# Patient Record
Sex: OTHER/UNKNOWN | URBAN - METROPOLITAN AREA
[De-identification: names, ages, dates, MRNs, and addresses within clinical notes are randomized per-mention and may not be internally consistent; named-entity substitution may affect disease eponyms.]

---

## 2021-03-23 ENCOUNTER — TELEPHONE ENCOUNTER (OUTPATIENT)
Dept: URBAN - METROPOLITAN AREA CLINIC 35 | Facility: CLINIC | Age: 56
End: 2021-03-23

## 2021-03-23 ENCOUNTER — OFFICE VISIT (OUTPATIENT)
Dept: URBAN - METROPOLITAN AREA CLINIC 35 | Facility: CLINIC | Age: 56
End: 2021-03-23

## 2021-03-23 VITALS
DIASTOLIC BLOOD PRESSURE: 70 MMHG | BODY MASS INDEX: 26.16 KG/M2 | WEIGHT: 157 LBS | SYSTOLIC BLOOD PRESSURE: 118 MMHG | TEMPERATURE: 97.4 F | HEIGHT: 65 IN

## 2021-03-23 RX ORDER — SODIUM SULFATE, MAGNESIUM SULFATE, AND POTASSIUM CHLORIDE 17.75; 2.7; 2.25 G/1; G/1; G/1
12 TABLETS AS DIRECTED TABLET ORAL
Qty: 1 KIT | Refills: 0 | OUTPATIENT
Start: 2021-03-23

## 2021-03-23 RX ORDER — ASPIRIN 325 MG/1
1 TABLET TABLET, FILM COATED ORAL ONCE A DAY
Qty: 30 | Status: ACTIVE | COMMUNITY

## 2021-03-23 RX ORDER — MULTIVIT WITH CALCIUM,IRON,MIN 27MG-0.4MG
AS DIRECTED TABLET ORAL
Status: ACTIVE | COMMUNITY

## 2021-03-23 RX ORDER — LISINOPRIL 5 MG/1
TABLET ORAL
Qty: 90 DELAYED RELEASE TABLET | Status: ACTIVE | COMMUNITY

## 2021-03-23 RX ORDER — FLUTICASONE PROPIONATE 50 UG/1
SPRAY, METERED NASAL
Qty: 16 G | Status: ACTIVE | COMMUNITY

## 2021-03-23 RX ORDER — ATORVASTATIN CALCIUM 20 MG/1
TABLET, FILM COATED ORAL
Qty: 90 DELAYED RELEASE TABLET | Status: ACTIVE | COMMUNITY

## 2021-03-23 NOTE — EXAM-MIGRATED EXAMINATIONS
GENERAL APPEARANCE: - alert, in no acute distress, well developed, well nourished;   HEAD: - normocephalic, atraumatic;   EYES: - sclera anicteric bilaterally;   ORAL CAVITY: - mucosa moist, MP 1;   THROAT: - clear;   NECK/THYROID: - neck supple, full range of motion, no cervical lymphadenopathy, no thyroid nodules, no thyromegaly, trachea midline;   SKIN: - warm and dry, no icterus;   HEART: - no murmurs, regular rate and rhythm, S1, S2 normal;   LUNGS: - clear to auscultation bilaterally, good air movement, no wheezes, rales, rhonchi;   ABDOMEN: - bowel sounds present, no masses palpable, no organomegaly , no rebound tenderness, soft, nontender, nondistended;   MUSCULOSKELETAL: - normal posture, normal gait and station, no decreased range of motion;   EXTREMITIES: - no clubbing, cyanosis, or edema;   PSYCH: - cooperative with exam, mood/affect full range;

## 2021-03-23 NOTE — HPI-MIGRATED HPI
;     Colorectal Cancer Screening : Patient is a 55 year old female whom presents today for consultation for a colonoscopy.  Patient admits her mother had history of benign colon polyps.   Patient currently admits 1-2 BMs per day. Stools are normal without melena, blood, or mucus.   Patient is taking Aspirin 325 daily due to a stroke in 2012.  No abdominal pain. No weight loss No CP, SOB or fever. No sleep apnea No cardiac or pulmonary issues ;

## 2021-03-29 ENCOUNTER — OFFICE VISIT (OUTPATIENT)
Dept: URBAN - METROPOLITAN AREA SURGERY CENTER 8 | Facility: SURGERY CENTER | Age: 56
End: 2021-03-29

## 2021-04-05 ENCOUNTER — TELEPHONE ENCOUNTER (OUTPATIENT)
Dept: URBAN - METROPOLITAN AREA CLINIC 35 | Facility: CLINIC | Age: 56
End: 2021-04-05

## 2021-04-20 ENCOUNTER — OFFICE VISIT (OUTPATIENT)
Dept: URBAN - METROPOLITAN AREA CLINIC 35 | Facility: CLINIC | Age: 56
End: 2021-04-20

## 2024-10-15 ENCOUNTER — TELEPHONE ENCOUNTER (OUTPATIENT)
Dept: URBAN - METROPOLITAN AREA CLINIC 35 | Facility: CLINIC | Age: 59
End: 2024-10-15

## 2024-10-15 ENCOUNTER — DASHBOARD ENCOUNTERS (OUTPATIENT)
Age: 59
End: 2024-10-15

## 2024-10-15 ENCOUNTER — OFFICE VISIT (OUTPATIENT)
Dept: URBAN - METROPOLITAN AREA CLINIC 35 | Facility: CLINIC | Age: 59
End: 2024-10-15
Payer: COMMERCIAL

## 2024-10-15 VITALS
BODY MASS INDEX: 27.32 KG/M2 | DIASTOLIC BLOOD PRESSURE: 72 MMHG | HEIGHT: 65 IN | WEIGHT: 164 LBS | SYSTOLIC BLOOD PRESSURE: 120 MMHG

## 2024-10-15 DIAGNOSIS — Z12.11 COLON CANCER SCREENING: ICD-10-CM

## 2024-10-15 DIAGNOSIS — Z83.719 FAMILY HISTORY OF COLONIC POLYPS: ICD-10-CM

## 2024-10-15 PROCEDURE — 99202 OFFICE O/P NEW SF 15 MIN: CPT | Performed by: PHYSICIAN ASSISTANT

## 2024-10-15 RX ORDER — MULTIVIT WITH CALCIUM,IRON,MIN 27MG-0.4MG
AS DIRECTED TABLET ORAL
Status: ACTIVE | COMMUNITY

## 2024-10-15 RX ORDER — LORATADINE 10 MG/1
1 TABLET TABLET ORAL ONCE A DAY
Status: ACTIVE | COMMUNITY

## 2024-10-15 RX ORDER — ALUMINUM ZIRCONIUM TRICHLOROHYDREX GLY 0.19 G/G
1 TABLET 30 MINUTES BEFORE MORNING MEAL STICK TOPICAL ONCE A DAY
Status: ACTIVE | COMMUNITY

## 2024-10-15 RX ORDER — ATORVASTATIN CALCIUM 20 MG/1
TABLET, FILM COATED ORAL
Qty: 90 DELAYED RELEASE TABLET | Status: ACTIVE | COMMUNITY

## 2024-10-15 RX ORDER — FLUTICASONE PROPIONATE 50 UG/1
SPRAY, METERED NASAL
Qty: 16 G | Status: ACTIVE | COMMUNITY

## 2024-10-15 RX ORDER — LISINOPRIL 5 MG/1
TABLET ORAL
Qty: 90 DELAYED RELEASE TABLET | Status: ACTIVE | COMMUNITY

## 2024-10-15 NOTE — HPI-SURVEILLANCE COLONOSCOPY
Patient is a 58 year old female whom presents today for consultation for a colonoscopy. Her last colonoscopy was in 2021 with benign polyp removed.  Patient admits her mother had history of benign colon polyps.  Patient currently admits 1-2 BMs per day. Stools are normal without melena, blood, or mucus.   No abdominal pain. No weight loss No CP, SOB or fever. No sleep apnea No cardiac or pulmonary issues

## 2024-12-18 ENCOUNTER — CLAIMS CREATED FROM THE CLAIM WINDOW (OUTPATIENT)
Dept: URBAN - METROPOLITAN AREA SURGERY CENTER 8 | Facility: SURGERY CENTER | Age: 59
End: 2024-12-18
Payer: COMMERCIAL

## 2024-12-18 ENCOUNTER — CLAIMS CREATED FROM THE CLAIM WINDOW (OUTPATIENT)
Dept: URBAN - METROPOLITAN AREA CLINIC 4 | Facility: CLINIC | Age: 59
End: 2024-12-18
Payer: COMMERCIAL

## 2024-12-18 DIAGNOSIS — D12.3 BENIGN NEOPLASM OF TRANSVERSE COLON: ICD-10-CM

## 2024-12-18 DIAGNOSIS — K63.5 POLYP OF COLON: ICD-10-CM

## 2024-12-18 DIAGNOSIS — K64.8 OTHER HEMORRHOIDS: ICD-10-CM

## 2024-12-18 DIAGNOSIS — D12.3 ADENOMA OF TRANSVERSE COLON: ICD-10-CM

## 2024-12-18 DIAGNOSIS — Z12.11 COLON CANCER SCREENING: ICD-10-CM

## 2024-12-18 DIAGNOSIS — K63.5 HYPERPLASTIC COLON POLYPS: ICD-10-CM

## 2024-12-18 PROCEDURE — 45380 COLONOSCOPY AND BIOPSY: CPT | Performed by: INTERNAL MEDICINE

## 2024-12-18 PROCEDURE — 88305 TISSUE EXAM BY PATHOLOGIST: CPT | Performed by: PATHOLOGY

## 2024-12-18 PROCEDURE — 00812 ANES LWR INTST SCR COLSC: CPT | Performed by: NURSE ANESTHETIST, CERTIFIED REGISTERED

## 2024-12-18 RX ORDER — ATORVASTATIN CALCIUM 20 MG/1
TABLET, FILM COATED ORAL
Qty: 90 DELAYED RELEASE TABLET | Status: ACTIVE | COMMUNITY

## 2024-12-18 RX ORDER — MULTIVIT WITH CALCIUM,IRON,MIN 27MG-0.4MG
AS DIRECTED TABLET ORAL
Status: ACTIVE | COMMUNITY

## 2024-12-18 RX ORDER — FLUTICASONE PROPIONATE 50 UG/1
SPRAY, METERED NASAL
Qty: 16 G | Status: ACTIVE | COMMUNITY

## 2024-12-18 RX ORDER — ALUMINUM ZIRCONIUM TRICHLOROHYDREX GLY 0.19 G/G
1 TABLET 30 MINUTES BEFORE MORNING MEAL STICK TOPICAL ONCE A DAY
Status: ACTIVE | COMMUNITY

## 2024-12-18 RX ORDER — LISINOPRIL 5 MG/1
TABLET ORAL
Qty: 90 DELAYED RELEASE TABLET | Status: ACTIVE | COMMUNITY